# Patient Record
Sex: MALE | Race: WHITE | Employment: OTHER | URBAN - METROPOLITAN AREA
[De-identification: names, ages, dates, MRNs, and addresses within clinical notes are randomized per-mention and may not be internally consistent; named-entity substitution may affect disease eponyms.]

---

## 2019-03-25 ENCOUNTER — HOSPITAL ENCOUNTER (OUTPATIENT)
Dept: GENERAL RADIOLOGY | Age: 84
Discharge: HOME | End: 2019-03-25
Payer: MEDICARE

## 2019-03-25 DIAGNOSIS — M89.371 HYPERTROPHY OF BONE, RIGHT ANKLE AND FOOT: ICD-10-CM

## 2019-03-25 PROCEDURE — 73630 X-RAY EXAM OF FOOT: CPT

## 2019-09-17 ENCOUNTER — HOSPITAL ENCOUNTER (OUTPATIENT)
Dept: PREADMISSION TESTING | Age: 84
Discharge: HOME | End: 2019-09-17
Payer: MEDICARE

## 2019-09-17 DIAGNOSIS — D23.71 DERMATOFIBROMA OF RIGHT LOWER LEG: ICD-10-CM

## 2019-09-17 DIAGNOSIS — M89.371 HYPERTROPHY OF BONE, RIGHT ANKLE AND FOOT: ICD-10-CM

## 2019-09-17 LAB
ANION GAP SERPL CALC-SCNC: 3 MMOL/L (ref 3–18)
ATRIAL RATE: 50 BPM
BUN SERPL-MCNC: 24 MG/DL (ref 7–18)
BUN/CREAT SERPL: 24 (ref 12–20)
CALCIUM SERPL-MCNC: 8.7 MG/DL (ref 8.5–10.1)
CALCULATED P AXIS, ECG09: 59 DEGREES
CALCULATED R AXIS, ECG10: 70 DEGREES
CALCULATED T AXIS, ECG11: 59 DEGREES
CHLORIDE SERPL-SCNC: 108 MMOL/L (ref 100–111)
CO2 SERPL-SCNC: 28 MMOL/L (ref 21–32)
CREAT SERPL-MCNC: 1.01 MG/DL (ref 0.6–1.3)
DIAGNOSIS, 93000: NORMAL
GLUCOSE SERPL-MCNC: 95 MG/DL (ref 74–99)
HCT VFR BLD AUTO: 40.6 % (ref 36–48)
HGB BLD-MCNC: 13.8 G/DL (ref 13–16)
P-R INTERVAL, ECG05: 300 MS
POTASSIUM SERPL-SCNC: 4.4 MMOL/L (ref 3.5–5.5)
Q-T INTERVAL, ECG07: 416 MS
QRS DURATION, ECG06: 90 MS
QTC CALCULATION (BEZET), ECG08: 379 MS
SODIUM SERPL-SCNC: 139 MMOL/L (ref 136–145)
VENTRICULAR RATE, ECG03: 50 BPM

## 2019-09-17 PROCEDURE — 36415 COLL VENOUS BLD VENIPUNCTURE: CPT

## 2019-09-17 PROCEDURE — 80048 BASIC METABOLIC PNL TOTAL CA: CPT

## 2019-09-17 PROCEDURE — 93005 ELECTROCARDIOGRAM TRACING: CPT

## 2019-09-17 PROCEDURE — 85018 HEMOGLOBIN: CPT

## 2019-10-03 ENCOUNTER — ANESTHESIA EVENT (OUTPATIENT)
Dept: SURGERY | Age: 84
End: 2019-10-03
Payer: MEDICARE

## 2019-10-03 RX ORDER — MAGNESIUM SULFATE 100 %
4 CRYSTALS MISCELLANEOUS AS NEEDED
Status: CANCELLED | OUTPATIENT
Start: 2019-10-03

## 2019-10-03 RX ORDER — NALOXONE HYDROCHLORIDE 0.4 MG/ML
0.1 INJECTION, SOLUTION INTRAMUSCULAR; INTRAVENOUS; SUBCUTANEOUS AS NEEDED
Status: CANCELLED | OUTPATIENT
Start: 2019-10-03

## 2019-10-03 RX ORDER — SODIUM CHLORIDE 0.9 % (FLUSH) 0.9 %
5-40 SYRINGE (ML) INJECTION EVERY 8 HOURS
Status: CANCELLED | OUTPATIENT
Start: 2019-10-03

## 2019-10-03 RX ORDER — HYDROMORPHONE HYDROCHLORIDE 2 MG/ML
0.5 INJECTION, SOLUTION INTRAMUSCULAR; INTRAVENOUS; SUBCUTANEOUS
Status: CANCELLED | OUTPATIENT
Start: 2019-10-03

## 2019-10-03 RX ORDER — FENTANYL CITRATE 50 UG/ML
25 INJECTION, SOLUTION INTRAMUSCULAR; INTRAVENOUS AS NEEDED
Status: CANCELLED | OUTPATIENT
Start: 2019-10-03

## 2019-10-03 RX ORDER — SODIUM CHLORIDE 0.9 % (FLUSH) 0.9 %
5-40 SYRINGE (ML) INJECTION AS NEEDED
Status: CANCELLED | OUTPATIENT
Start: 2019-10-03

## 2019-10-03 RX ORDER — FLUMAZENIL 0.1 MG/ML
0.2 INJECTION INTRAVENOUS
Status: CANCELLED | OUTPATIENT
Start: 2019-10-03

## 2019-10-03 RX ORDER — DEXTROSE MONOHYDRATE 100 MG/ML
125-250 INJECTION, SOLUTION INTRAVENOUS AS NEEDED
Status: CANCELLED | OUTPATIENT
Start: 2019-10-03

## 2019-10-03 RX ORDER — SODIUM CHLORIDE, SODIUM LACTATE, POTASSIUM CHLORIDE, CALCIUM CHLORIDE 600; 310; 30; 20 MG/100ML; MG/100ML; MG/100ML; MG/100ML
1000 INJECTION, SOLUTION INTRAVENOUS CONTINUOUS
Status: CANCELLED | OUTPATIENT
Start: 2019-10-03

## 2019-10-04 ENCOUNTER — ANESTHESIA (OUTPATIENT)
Dept: SURGERY | Age: 84
End: 2019-10-04
Payer: MEDICARE

## 2019-10-04 ENCOUNTER — APPOINTMENT (OUTPATIENT)
Dept: GENERAL RADIOLOGY | Age: 84
End: 2019-10-04
Attending: PODIATRIST
Payer: MEDICARE

## 2019-10-04 ENCOUNTER — HOSPITAL ENCOUNTER (OUTPATIENT)
Age: 84
Setting detail: OUTPATIENT SURGERY
Discharge: HOME | End: 2019-10-04
Attending: PODIATRIST | Admitting: PODIATRIST
Payer: MEDICARE

## 2019-10-04 VITALS
TEMPERATURE: 97.6 F | HEART RATE: 48 BPM | WEIGHT: 168.44 LBS | RESPIRATION RATE: 16 BRPM | DIASTOLIC BLOOD PRESSURE: 88 MMHG | OXYGEN SATURATION: 100 % | HEIGHT: 71 IN | SYSTOLIC BLOOD PRESSURE: 117 MMHG | BODY MASS INDEX: 23.58 KG/M2

## 2019-10-04 PROBLEM — D23.71: Status: ACTIVE | Noted: 2019-10-04

## 2019-10-04 PROBLEM — M89.371 HYPERTROPHY OF BONE, RIGHT ANKLE AND FOOT: Status: RESOLVED | Noted: 2019-10-04 | Resolved: 2019-10-04

## 2019-10-04 PROBLEM — D23.71: Status: RESOLVED | Noted: 2019-10-04 | Resolved: 2019-10-04

## 2019-10-04 PROBLEM — M89.371 HYPERTROPHY OF BONE, RIGHT ANKLE AND FOOT: Status: ACTIVE | Noted: 2019-10-04

## 2019-10-04 PROCEDURE — 77030002916 HC SUT ETHLN J&J -A: Performed by: PODIATRIST

## 2019-10-04 PROCEDURE — 74011000250 HC RX REV CODE- 250: Performed by: PODIATRIST

## 2019-10-04 PROCEDURE — 74011250636 HC RX REV CODE- 250/636: Performed by: PODIATRIST

## 2019-10-04 PROCEDURE — 76010000149 HC OR TIME 1 TO 1.5 HR: Performed by: PODIATRIST

## 2019-10-04 PROCEDURE — 77030020753 HC CUF TRNQT 1BLA STRY -B: Performed by: PODIATRIST

## 2019-10-04 PROCEDURE — 88309 TISSUE EXAM BY PATHOLOGIST: CPT

## 2019-10-04 PROCEDURE — 77030020782 HC GWN BAIR PAWS FLX 3M -B: Performed by: PODIATRIST

## 2019-10-04 PROCEDURE — 88304 TISSUE EXAM BY PATHOLOGIST: CPT

## 2019-10-04 PROCEDURE — 77030013708 HC HNDPC SUC IRR PULS STRY –B: Performed by: PODIATRIST

## 2019-10-04 PROCEDURE — 76210000021 HC REC RM PH II 0.5 TO 1 HR: Performed by: PODIATRIST

## 2019-10-04 PROCEDURE — 74011000250 HC RX REV CODE- 250

## 2019-10-04 PROCEDURE — 88305 TISSUE EXAM BY PATHOLOGIST: CPT

## 2019-10-04 PROCEDURE — 73630 X-RAY EXAM OF FOOT: CPT

## 2019-10-04 PROCEDURE — 77030040361 HC SLV COMPR DVT MDII -B: Performed by: PODIATRIST

## 2019-10-04 PROCEDURE — 74011000272 HC RX REV CODE- 272: Performed by: PODIATRIST

## 2019-10-04 PROCEDURE — 76060000033 HC ANESTHESIA 1 TO 1.5 HR: Performed by: PODIATRIST

## 2019-10-04 PROCEDURE — 88311 DECALCIFY TISSUE: CPT

## 2019-10-04 PROCEDURE — 74011250636 HC RX REV CODE- 250/636

## 2019-10-04 PROCEDURE — 77030031139 HC SUT VCRL2 J&J -A: Performed by: PODIATRIST

## 2019-10-04 RX ORDER — SODIUM CHLORIDE, SODIUM LACTATE, POTASSIUM CHLORIDE, CALCIUM CHLORIDE 600; 310; 30; 20 MG/100ML; MG/100ML; MG/100ML; MG/100ML
125 INJECTION, SOLUTION INTRAVENOUS CONTINUOUS
Status: DISCONTINUED | OUTPATIENT
Start: 2019-10-04 | End: 2019-10-04 | Stop reason: HOSPADM

## 2019-10-04 RX ORDER — CEFAZOLIN SODIUM/WATER 2 G/20 ML
2 SYRINGE (ML) INTRAVENOUS ONCE
Status: COMPLETED | OUTPATIENT
Start: 2019-10-04 | End: 2019-10-04

## 2019-10-04 RX ORDER — LIDOCAINE HYDROCHLORIDE 20 MG/ML
INJECTION, SOLUTION EPIDURAL; INFILTRATION; INTRACAUDAL; PERINEURAL AS NEEDED
Status: DISCONTINUED | OUTPATIENT
Start: 2019-10-04 | End: 2019-10-04 | Stop reason: HOSPADM

## 2019-10-04 RX ORDER — PROPOFOL 10 MG/ML
INJECTION, EMULSION INTRAVENOUS AS NEEDED
Status: DISCONTINUED | OUTPATIENT
Start: 2019-10-04 | End: 2019-10-04 | Stop reason: HOSPADM

## 2019-10-04 RX ADMIN — PROPOFOL 60 MG: 10 INJECTION, EMULSION INTRAVENOUS at 09:01

## 2019-10-04 RX ADMIN — PROPOFOL 30 MG: 10 INJECTION, EMULSION INTRAVENOUS at 09:39

## 2019-10-04 RX ADMIN — SODIUM CHLORIDE, SODIUM LACTATE, POTASSIUM CHLORIDE, AND CALCIUM CHLORIDE 125 ML/HR: 600; 310; 30; 20 INJECTION, SOLUTION INTRAVENOUS at 07:57

## 2019-10-04 RX ADMIN — Medication 2 G: at 08:58

## 2019-10-04 RX ADMIN — LIDOCAINE HYDROCHLORIDE 60 MG: 20 INJECTION, SOLUTION EPIDURAL; INFILTRATION; INTRACAUDAL; PERINEURAL at 09:01

## 2019-10-04 NOTE — ANESTHESIA PREPROCEDURE EVALUATION
Relevant Problems   No relevant active problems       Anesthetic History     PONV          Review of Systems / Medical History  Patient summary reviewed, nursing notes reviewed and pertinent labs reviewed    Pulmonary  Within defined limits                 Neuro/Psych   Within defined limits           Cardiovascular                  Exercise tolerance: >4 METS     GI/Hepatic/Renal     GERD: well controlled           Endo/Other  Within defined limits      Arthritis     Other Findings   Comments: Decreased rom right shoulder           Physical Exam    Airway  Mallampati: II  TM Distance: 4 - 6 cm  Neck ROM: normal range of motion   Mouth opening: Normal     Cardiovascular    Rhythm: regular  Rate: normal         Dental  No notable dental hx       Pulmonary  Breath sounds clear to auscultation               Abdominal  GI exam deferred       Other Findings            Anesthetic Plan    ASA: 2  Anesthesia type: MAC          Induction: Intravenous  Anesthetic plan and risks discussed with: Patient

## 2019-10-04 NOTE — ANESTHESIA POSTPROCEDURE EVALUATION
Procedure(s):  OSTECTOMY RIGHT 5TH METATARSAL,  EXCISION NEOPLASM RIGHT FOOT **SPEC POP**. MAC    Anesthesia Post Evaluation        Comments: Post-Anesthesia Evaluation & Assessment    Patient hemodynamically stable    No untreated/active PONV    Post-operative hydration adequate. Adequate post-operative analgesia per PACU discharge criteria    Mental status & level of consciousness: alert and oriented x 3    Respiratory status: patent unassisted airway     No apparent anesthetic complications requiring additional post-anesthetic care    Patient has met all discharge requirements. Luisa Mendez MD          No vitals data found for the desired time range.

## 2019-10-04 NOTE — DISCHARGE INSTRUCTIONS
Dr. Jay Acosta Post-op Podiatry Instructions    1. A SURGICAL PROCEDURE HAS JUST BEEN PERFORMED ON YOUR FOOT IN ORDER TO ELIMINATE YOUR PROBLEM. THE TECHNIQUES AND PROCEDURES USED ARE THE MOST MODERN AND EFFECTIVE IN THE FIELD OF FOOT SURGERY. THE AMOUNT OF DISCOMFORT AND SWELLING WILL VARY FROM ONE PATIENT TO ANOTHER. 2. THE EFFECTS OF ANESTHESIA ARE STILL PRESENT AND DROWSINESS MAY RESULT. DO NOT DRIVE OR OPERATE ANY EQUIPMENT TODAY. 3. REST TODAY, KEEPING YOUR FOOT AND LEG ELEVATED APPROXIMATELY SIX (6\") INCHES ABOVE THE LEVEL OF YOUR HIP. 4. PLACE AN ICE PACK BEHIND THE KNEE OF THE OPERATED LEG FOR FIFTEEN (20) MINUTES OUT OF EACH HALF HOUR YOU ARE AWAKE. USE ICE FOR THE NEXT SEVENTY-TWO (72) HOURS AFTER SURGERY. 5. PLEASE DO NOT REMOVE THE BANDAGE UNLESS INSTRUCTED BY THE DOCTOR TO DO SO.  IF THE BANDAGE FEELS TIGHT, YOU MAY LOOSEN THE OUTERMOST DRESSING ONLY. IF THIS DOES NOT GIVE RELIEF, CALL THE DOCTOR. 6. DO NOT GET THE BANDAGE WET!! 7. FOR YOUR DIET, YOU SHOULD START LIGHT (MOSTLY FLUIDS TODAY) AND PROGRESS TO YOUR REGULAR DIET AS TOLERATED. 8. YOU SHOULD ALREADY HAVE YOUR PRESCRIPTIONS, TAKE AS DIRECTED. NO ALCOHOLIC BEVERAGES SHOULD BE CONSUMED WHILE ON PAIN MEDICATION. 9. YOU MUST WEAR THE SURGICAL SHOE WHILE WALKING AND/OR CRUTCHES/WALKER IF THEY HAVE BEEN PROVIDED TO YOU.    10. NOTIFY DR. Goodson IF THE FOLLOWING OCCURS:  · LARGE AMOUNT OF BLEEDING  · FEVER OVER 101 DEGREES  · WOUND BECOMES EXTREMELY SWOLLEN, SHOWS RED STREAKS, OR BECOMES MORE SORE EACH DAY  · THE OPERATIVE EXTREMITY BECOMES VERY SWOLLEN, COLD TO TOUCH OR CHANGES COLOR  · BANDAGE GETS WET  · ANY REACTION TO MEDICATION  · IF UNABLE TO CONTACT DR. Goodson, PLEASE CALL OR GO TO THE NEAREST EMERGENCY ROOM. FOLLOWING THESE INSTRUCTIONS WILL HELP INSURE A SUCCESSFUL SURGICAL RESULT.   IGNORING THESE INSTRUCTIONS WILL MOST LIKELY RESULT IN A DELAY IN HEALING, INCREASED SWELLING, POSSIBLE INFECTION, OR A MORE SERIOUS COMPLICATION. WE ENCOURAGE YOU TO CALL THE OFFICE WITH ANY QUESTIONS OR CONCERNS. IF AFTER NORMAL OFFICE HOURS, DR. Goodson CAN BE REACHED at [phone] or [phone]. DISCHARGE SUMMARY from Nurse    PATIENT INSTRUCTIONS:    After general anesthesia or intravenous sedation, for 24 hours or while taking prescription Narcotics:  · Limit your activities  · Do not drive and operate hazardous machinery  · Do not make important personal or business decisions  · Do  not drink alcoholic beverages  · If you have not urinated within 8 hours after discharge, please contact your surgeon on call. Report the following to your surgeon:  · Excessive pain, swelling, redness or odor of or around the surgical area  · Temperature over 100.5  · Nausea and vomiting lasting longer than 4 hours or if unable to take medications  · Any signs of decreased circulation or nerve impairment to extremity: change in color, persistent  numbness, tingling, coldness or increase pain  · Any questions    What to do at Home:  Recommended activity: as noted above. If you experience any of the following symptoms as noted above, please follow up with Dr. Glenny Mena. *  Please give a list of your current medications to your Primary Care Provider. *  Please update this list whenever your medications are discontinued, doses are      changed, or new medications (including over-the-counter products) are added. *  Please carry medication information at all times in case of emergency situations. These are general instructions for a healthy lifestyle:    No smoking/ No tobacco products/ Avoid exposure to second hand smoke  Surgeon General's Warning:  Quitting smoking now greatly reduces serious risk to your health.     Obesity, smoking, and sedentary lifestyle greatly increases your risk for illness    A healthy diet, regular physical exercise & weight monitoring are important for maintaining a healthy lifestyle    You may be retaining fluid if you have a history of heart failure or if you experience any of the following symptoms:  Weight gain of 3 pounds or more overnight or 5 pounds in a week, increased swelling in our hands or feet or shortness of breath while lying flat in bed. Please call your doctor as soon as you notice any of these symptoms; do not wait until your next office visit. The discharge information has been reviewed with the patient and caregiver. The patient and caregiver verbalized understanding. Discharge medications reviewed with the patient and caregiver and appropriate educational materials and side effects teaching were provided. Patient armband removed and shredded.     ___________________________________________________________________________________________________________________________________

## 2019-10-04 NOTE — H&P
Date of Surgery Update:  Wilfrid Torrez was seen and examined. History and physical has been reviewed. The patient has been examined.  There have been no significant clinical changes since the completion of the originally dated History and Physical.    Signed By: Silvia Vera DPM     October 4, 2019 8:39 AM

## 2019-10-04 NOTE — BRIEF OP NOTE
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,NEOPLASM    Procedure(s):  OSTECTOMY RIGHT 5TH METATARSAL,  EXCISION NEOPLASM RIGHT FOOT **SPEC POP**  Surgeon(s) and Role:     Tony Lopez DPM - Primary         Surgical Assistant: Dodie Braden    Surgical Staff:  Circ-1: Shira Frazier RN  Circ-2: Bubba Santos RN  Surg Asst-1: Norma Briggs  Surg Asst-2: Eliseo TIRADO  Event Time In Time Out   Incision Start 0919    Incision Close 1010      Anesthesia: MAC   Estimated Blood Loss: min<5ml  Specimens:   ID Type Source Tests Collected by Time Destination   1 : bursa right foot 5th digit Preservative   Brittnee Alfaro DPM 10/4/2019 8729 Pathology   2 : neoplasm right foot Preservative   Brittnee Alfaro DPM 10/4/2019 1008 Pathology      Findings: exostosis right 5th metatarsal and neoplasm right foot   Complications: none  Implants: * No implants in log *

## 2019-10-06 NOTE — OP NOTES
[facility]    Name:  Caryl Cisneros  MR#:   [id number]  :  [id number]  ACCOUNT #:  [de-identified]  DATE OF SERVICE:  10/04/2019    PREOPERATIVE DIAGNOSES:  1.  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  2.  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 POSTOPERATIVE DIAGNOSES:  1.  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  2.  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 PROCEDURE PERFORMED:  1. Ostectomy of the right fifth metatarsal head. 2.  Excision of painful neoplasm, left foot. SURGEON:  Latonia Leo DPM    ASSISTANT:  Shayy De Luna. ANESTHESIA:  MAC with local consisting of 5 mL of 0.5% Marcaine plain given to the right foot. HEMOSTASIS:  There was no tourniquet used in this procedure. COMPLICATIONS:  None. SPECIMENS REMOVED:  Bone from the right fifth metatarsal, also neoplasm, and also bursa from the right lateral fifth metatarsal head. IMPLANTS:  None    ESTIMATED BLOOD LOSS:  Minimal, less than 5 mL. MATERIALS:  A 3-0 Vicryl and 5-0 nylon. INJECTABLES:  6 mL of 0.5% Marcaine plain. INDICATIONS:  The patient is a 80-year-old male who presents to [facility] for outpatient surgical correction of painful tailor's bunion deformity of the right foot and also neoplasm of the right foot. All conservative treatments have failed to offer the patient adequate relief and the patient desires surgical correction. The planned procedures were explained to the patient in detail, including all risks, benefits, and possible complications and the patient still desires surgical correction. Medical clearance was obtained prior to surgery. Consent was signed and on chart. All the patient's questions were answered and no guarantees were given. Lastly, the patient was asked if he had any history of bleeding disorders and blood clots, and he stated that he did.   Therefore, he remained on his blood thinner and also no tourniquets were used in this procedure. Also SCDs were used on both legs to try to prevent blood clots. PROCEDURE:  The patient was brought to the operating room and placed on the operating room table in the supine position. The patient's right foot was then anesthetized using 5 mL of 0.5% Marcaine plain as a local block. Next, the patient's right foot was then prepped and draped in the usual sterile manner. Attention was then drawn to the lateral right forefoot where a dark neoplasm was then noted. A #15-blade was then used to make two 1-cm elliptical incisions around the neoplasm deep to the subcutaneous tissue, and the entire neoplasm was then removed and sent to Pathology for analysis. The surgical site was then flushed with copious amounts of normal sterile saline. The incisions were then closed using 5-0 nylon in a simple interrupted technique. Attention was the drawn to the lateral aspect of the patient's right forefoot, where a #15-blade was then used to make an approximately 5-cm linear skin incision. A second #15-blade was then used to deepen this incision through the subcutaneous layers being careful to avoid any neurovascular structures. The incision was then deepened with blunt dissection to expose the head of the fifth metatarsal.  The capsule was then dissected fully off the lateral aspect of the head of the fifth metatarsal to expose the exostosis, which was then noted at the dorsolateral aspect of the head of the fifth metatarsal as a large hypertrophy piece of bone. A sagittal saw was then used to make a through-and-through osteotomy in the sagittal plane to remove the lateral aspect of the right fifth metatarsal head to complete the osteoectomy. The lateral eminence or exostosis of the head of the fifth metatarsal was then removed. The bone bur was then used to smooth down any rough edges of the metatarsal head.   The area was then flushed with copious amounts of normal sterile saline. At this point, there was also an enlarged bursal sac noted laterally to the head of the fifth metatarsal.  This bursal sac was then removed and sent to Pathology. The capsule was then closed using 3-0 Vicryl in a simple interrupted technique. The skin was then closed using 5-0 nylon in a subcuticular running stitch. The wound was then dressed using Steri-Strips, Adaptic, 4x4's, Kerlix, and an Ace wrap. The patient was then transported to the recovery room with vital signs stable and neurovascular status returned to baseline for the patient's right foot. The patient tolerated the procedures and anesthesia well with no complications. POSTOPERATIVE CHECK:  The patient was later seen in the recovery room with dressings clean, dry, and intact with no bleeding noted to his right foot. He stated that he had no pain. The patient's neurovascular status was checked. Capillary refill time was found to be less than 2 seconds for all five toes. The patient was later discharged to home. He was given a surgical shoe. He was also given a postoperative prescription for pain. He was given a follow up appointment with Dr. Alejandra Zurita in the office in 3 days. He was also given discharge instructions. He was lastly given Dr. Duarte Aguero cell phone number and instructed to call anytime with any problems.       KIRSTEN [id number]  D:  10/05/2019 18:43  T:  10/06/2019 3:42  JOB #:  [id number]

## (undated) DEVICE — SYR 5ML 1/5 GRAD LL NSAF LF --

## (undated) DEVICE — INTENDED FOR TISSUE SEPARATION, AND OTHER PROCEDURES THAT REQUIRE A SHARP SURGICAL BLADE TO PUNCTURE OR CUT.: Brand: BARD-PARKER ® CARBON RIB-BACK BLADES

## (undated) DEVICE — PACK PROCEDURE SURG EXTREMITY CUST

## (undated) DEVICE — DRSG GZ OIL EMUL CURAD 3X3 --

## (undated) DEVICE — TRAY PREP DRY W/ PREM GLV 2 APPL 6 SPNG 2 UNDPD 1 OVERWRAP

## (undated) DEVICE — SUTURE VCRL SZ 3-0 L18IN ABSRB UD PS-2 L19MM 1/2 CIR J497G

## (undated) DEVICE — GARMENT,MEDLINE,DVT,INT,CALF,MED, GEN2: Brand: MEDLINE

## (undated) DEVICE — BANDAGE COMPR EXSANGUATION SGL LAYERED NO CLSR 9FT LEN 4IN W

## (undated) DEVICE — DISPOSABLE TOURNIQUET CUFF SINGLE BLADDER, SINGLE PORT AND QUICK CONNECT CONNECTOR: Brand: COLOR CUFF

## (undated) DEVICE — UNDERCAST PADDING: Brand: DEROYAL

## (undated) DEVICE — REM POLYHESIVE ADULT PATIENT RETURN ELECTRODE: Brand: VALLEYLAB

## (undated) DEVICE — SYR 20ML LL STRL LF --

## (undated) DEVICE — 3L THIN WALL CAN: Brand: CRD

## (undated) DEVICE — DRAPE TWL SURG 16X26IN BLU ORB04] ALLCARE INC]

## (undated) DEVICE — MASTISOL ADHESIVE LIQ 2/3ML

## (undated) DEVICE — GAUZE,SPONGE,8"X4",12PLY,XRAY,STRL,LF: Brand: MEDLINE

## (undated) DEVICE — STRIP SKIN CLSR 1/4INX1.5IN REINF WND NYL CURAD

## (undated) DEVICE — SYR 10ML LUER LOK 1/5ML GRAD --

## (undated) DEVICE — SPONGE GZ W4XL4IN COT 12 PLY TYP VII WVN C FLD DSGN

## (undated) DEVICE — HANDPIECE SET WITH FAN SPRAY TIP: Brand: INTERPULSE

## (undated) DEVICE — NDL PRT INJ NSAF BLNT 18GX1.5 --

## (undated) DEVICE — GOWN,AURORA,NONRNF,XL,30/CS: Brand: MEDLINE

## (undated) DEVICE — DRAPE XR C ARM 41X74IN LF --

## (undated) DEVICE — BANDAGE COMPR SGL LAYERED CLP CLSR ELAS 15FT LEN 6IN W

## (undated) DEVICE — NEEDLE HYPO 18GA L1.5IN PNK POLYPR HUB S STL THN WALL FILL

## (undated) DEVICE — STRAP,POSITIONING,KNEE/BODY,FOAM,4X60": Brand: MEDLINE

## (undated) DEVICE — 3M™ TEGADERM™ TRANSPARENT FILM DRESSING FRAME STYLE, 1626W, 4 IN X 4-3/4 IN (10 CM X 12 CM), 50/CT 4CT/CASE: Brand: 3M™ TEGADERM™

## (undated) DEVICE — (D)PREP SKN CHLRAPRP APPL 26ML -- CONVERT TO ITEM 371833

## (undated) DEVICE — BANDAGE COMPR W3XL186IN SYNTH KNIT DSGN COHESIVE ELAS ECON

## (undated) DEVICE — SUT ETHLN 5-0 18IN P3 BLK --

## (undated) DEVICE — BANDAGE COMPR W4INXL5YD ELAS CLP CLSR

## (undated) DEVICE — NEEDLE HYPO 27GA L1.25IN GRY POLYPR HUB S STL REG BVL STR

## (undated) DEVICE — BNDG SOF-FORM 2X75 STRL LF --

## (undated) DEVICE — SUTURE VCRL SZ 4-0 L27IN ABSRB UD L19MM PS-2 3/8 CIR PRIM J426H

## (undated) DEVICE — CATH IV AUTOGRD ORN 14GA 45MM -- INSYTE-N

## (undated) DEVICE — SUTURE VCRL SZ 3-0 L54IN ABSRB UD LIGAPAK REEL POLYGLACTIN J285G